# Patient Record
Sex: MALE | Race: WHITE | NOT HISPANIC OR LATINO
[De-identification: names, ages, dates, MRNs, and addresses within clinical notes are randomized per-mention and may not be internally consistent; named-entity substitution may affect disease eponyms.]

---
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
## 2024-03-01 ENCOUNTER — NON-APPOINTMENT (OUTPATIENT)
Age: 24
End: 2024-03-01

## 2024-04-10 ENCOUNTER — APPOINTMENT (OUTPATIENT)
Dept: PEDIATRIC GASTROENTEROLOGY | Facility: CLINIC | Age: 24
End: 2024-04-10
Payer: MEDICAID

## 2024-04-10 VITALS
HEART RATE: 73 BPM | HEIGHT: 74.06 IN | BODY MASS INDEX: 29.54 KG/M2 | WEIGHT: 230.16 LBS | SYSTOLIC BLOOD PRESSURE: 125 MMHG | DIASTOLIC BLOOD PRESSURE: 80 MMHG

## 2024-04-10 DIAGNOSIS — K82.4 CHOLESTEROLOSIS OF GALLBLADDER: ICD-10-CM

## 2024-04-10 DIAGNOSIS — K76.0 FATTY (CHANGE OF) LIVER, NOT ELSEWHERE CLASSIFIED: ICD-10-CM

## 2024-04-10 PROCEDURE — 99214 OFFICE O/P EST MOD 30 MIN: CPT

## 2024-04-10 NOTE — REVIEW OF SYSTEMS
[Bleeding] : bleeding [Negative] : Skin [Immunizations are up to date] : Immunizations are up to date [de-identified] : vWF deficiency (?)

## 2024-04-10 NOTE — HISTORY OF PRESENT ILLNESS
[de-identified] : March 2024 abd US showed multiple gb polyps, and fatty liver March 2024 ALT 98  lost mother and grandfather in the last year  Corey has gained weight since last visit  has not been more consistent with diet and exercise no abdominal pain, no fevers  has daily BM, regular, no blood  last Fibroscan 7/2021 shows that his liver condition was stable without significant worsening steatosis or fibrosis. [de-identified] : 7/14/21 A fibroscan was done using the XL probe:  dB/m, TE 2.8 kPa\par  8/2020 fibroscan using the M probe:  dB/m (S3), TE 4.1 kPa (F0-F1) \par  7/23/19 in office fibroscan - normal stiffness 4.9 kPa, \par  2/2018 echogenic liver, normal stiffness. 5.4 kPa\par  12/5/13 RUQ abd US: hepatic steatosis

## 2024-04-10 NOTE — CONSULT LETTER
[Dear  ___] : Dear  [unfilled], [Courtesy Letter:] : I had the pleasure of seeing your patient, [unfilled], in my office today. [Please see my note below.] : Please see my note below. [Consult Closing:] : Thank you very much for allowing me to participate in the care of this patient.  If you have any questions, please do not hesitate to contact me. [Sincerely,] : Sincerely, [FreeTextEntry3] : Alverto Loomis MD MSc \par  Director, Pediatric Endoscopy\par  Pediatric Gastroenterology and Nutrition\par  [facility]\par  Bety Villafuerte [facility] \par  [facility] \par  Division of Pediatric Gastroenterology and Nutrition \par  [address] [place] [address], [facility] [address] \[place]  [place] [address] \par  [phone] \par

## 2024-04-10 NOTE — PHYSICAL EXAM
[Well Developed] : well developed [Well Nourished] : well nourished [NAD] : in no acute distress [Alert and Active] : alert and active [PERRL] : pupils were equal, round, reactive to light  [Moist & Pink Mucous Membranes] : moist and pink mucous membranes [CTAB] : lungs clear to auscultation bilaterally [Regular Rate and Rhythm] : regular rate and rhythm [Soft] : soft  [Normal Bowel Sounds] : normal bowel sounds [No HSM] : no hepatosplenomegaly appreciated [Normal Tone] : normal tone [Well-Perfused] : well-perfused [Interactive] : interactive [Appropriate Behavior] : appropriate behavior [icteric] : anicteric [Distended] : non distended [Tender] : non tender [Rash] : no rash [Jaundice] : no jaundice

## 2024-04-10 NOTE — ASSESSMENT
[FreeTextEntry1] : Corey is a delightful young man with a history of elevated liver enzymes, and a known history of non-alcoholic fatty liver disease (NAFLD). His liver enzymes were recently more abnormal. He recently was found to have gallbladder polyps on ultrasound, which need to be followed, and cholecystectomy may need to be considered. The following was recommended: - continue diet and physical activity - return to office in 3-4 months, will need rpt liver enzymes and ultrasound, and should have fibroscan

## 2024-07-09 ENCOUNTER — NON-APPOINTMENT (OUTPATIENT)
Age: 24
End: 2024-07-09

## 2024-08-07 ENCOUNTER — APPOINTMENT (OUTPATIENT)
Dept: PEDIATRIC GASTROENTEROLOGY | Facility: CLINIC | Age: 24
End: 2024-08-07

## 2024-08-07 PROCEDURE — 99214 OFFICE O/P EST MOD 30 MIN: CPT

## 2024-08-07 PROCEDURE — 91200 LIVER ELASTOGRAPHY: CPT

## 2024-08-07 NOTE — CONSULT LETTER
[Dear  ___] : Dear  [unfilled], [Courtesy Letter:] : I had the pleasure of seeing your patient, [unfilled], in my office today. [Please see my note below.] : Please see my note below. [Consult Closing:] : Thank you very much for allowing me to participate in the care of this patient.  If you have any questions, please do not hesitate to contact me. [Sincerely,] : Sincerely, [FreeTextEntry3] : Alverto Loomis MD MSc \par  Director, Pediatric Endoscopy\par  Pediatric Gastroenterology and Nutrition\par  [facility]\par  Bety Villafuerte [facility] \par  [facility] \par  Division of Pediatric Gastroenterology and Nutrition \par  [address] [place] [address], [facility] [address] \[place]  [place] [address] \par  [phone] \par

## 2024-08-07 NOTE — ASSESSMENT
[FreeTextEntry1] : Corey is a delightful young man with a history of elevated liver enzymes, and a known history of non-alcoholic fatty liver disease (NAFLD). His liver enzymes were recently more abnormal. He recently was found to have gallbladder polyps on ultrasound, which need to be followed, and cholecystectomy may need to be considered. Fibroscan today shows stable findings. The following was recommended: - continue diet and physical activity - return to office in 3-4 months, will need rpt liver enzymes and ultrasound

## 2024-08-07 NOTE — PHYSICAL EXAM
[Well Developed] : well developed [Well Nourished] : well nourished [NAD] : in no acute distress [Alert and Active] : alert and active [Moist & Pink Mucous Membranes] : moist and pink mucous membranes [Well-Perfused] : well-perfused [Interactive] : interactive [Appropriate Behavior] : appropriate behavior [icteric] : anicteric [Respiratory Distress] : no respiratory distress  [Distended] : non distended [Focal Deficits] : no focal deficits [Rash] : no rash [Jaundice] : no jaundice [FreeTextEntry5] : no cyanosis

## 2024-08-07 NOTE — HISTORY OF PRESENT ILLNESS
[de-identified] : March 2024 abd US showed multiple gb polyps, and fatty liver March 2024 ALT 98 Corey has gained weight since last visit  has not been more consistent with diet and exercise no abdominal pain, no fevers  has daily BM, regular, no blood  last Fibroscan 7/2021 shows that his liver condition was stable without significant worsening steatosis or fibrosis. mother and grandfather passed in 2023 [de-identified] : 8/7/24 A Fibroscan was done using the XL probe.   dB/m TE 5.6 kPa  7/14/21 A fibroscan was done using the XL probe:  dB/m, TE 2.8 kPa 8/2020 fibroscan using the M probe:  dB/m (S3), TE 4.1 kPa (F0-F1)  7/23/19 in office fibroscan - normal stiffness 4.9 kPa,  2/2018 echogenic liver, normal stiffness. 5.4 kPa 12/5/13 RUQ abd US: hepatic steatosis

## 2024-08-07 NOTE — REVIEW OF SYSTEMS
[Bleeding] : bleeding [Negative] : Skin [Immunizations are up to date] : Immunizations are up to date [de-identified] : vWF deficiency (?)

## 2024-12-11 ENCOUNTER — APPOINTMENT (OUTPATIENT)
Dept: PEDIATRIC GASTROENTEROLOGY | Facility: CLINIC | Age: 24
End: 2024-12-11
Payer: MEDICAID

## 2024-12-11 VITALS
DIASTOLIC BLOOD PRESSURE: 77 MMHG | HEIGHT: 73.7 IN | HEART RATE: 80 BPM | BODY MASS INDEX: 28.14 KG/M2 | SYSTOLIC BLOOD PRESSURE: 151 MMHG | WEIGHT: 216.93 LBS

## 2024-12-11 DIAGNOSIS — K76.0 FATTY (CHANGE OF) LIVER, NOT ELSEWHERE CLASSIFIED: ICD-10-CM

## 2024-12-11 DIAGNOSIS — K82.4 CHOLESTEROLOSIS OF GALLBLADDER: ICD-10-CM

## 2024-12-11 PROCEDURE — 99214 OFFICE O/P EST MOD 30 MIN: CPT

## 2024-12-13 LAB
ALBUMIN SERPL ELPH-MCNC: 4.6 G/DL
ALP BLD-CCNC: 101 U/L
ALT SERPL-CCNC: 20 U/L
AST SERPL-CCNC: 16 U/L
BILIRUB DIRECT SERPL-MCNC: 0.1 MG/DL
BILIRUB INDIRECT SERPL-MCNC: 0.4 MG/DL
BILIRUB SERPL-MCNC: 0.5 MG/DL
GGT SERPL-CCNC: 19 U/L
PROT SERPL-MCNC: 8.1 G/DL

## 2024-12-30 ENCOUNTER — NON-APPOINTMENT (OUTPATIENT)
Age: 24
End: 2024-12-30

## 2025-03-18 ENCOUNTER — APPOINTMENT (OUTPATIENT)
Dept: PEDIATRIC GASTROENTEROLOGY | Facility: CLINIC | Age: 25
End: 2025-03-18
Payer: MEDICAID

## 2025-03-18 VITALS
WEIGHT: 216.05 LBS | HEIGHT: 73.9 IN | DIASTOLIC BLOOD PRESSURE: 84 MMHG | BODY MASS INDEX: 27.73 KG/M2 | SYSTOLIC BLOOD PRESSURE: 137 MMHG | HEART RATE: 64 BPM

## 2025-03-18 DIAGNOSIS — K76.0 FATTY (CHANGE OF) LIVER, NOT ELSEWHERE CLASSIFIED: ICD-10-CM

## 2025-03-18 DIAGNOSIS — K82.4 CHOLESTEROLOSIS OF GALLBLADDER: ICD-10-CM

## 2025-03-18 PROCEDURE — 99213 OFFICE O/P EST LOW 20 MIN: CPT

## 2025-06-24 ENCOUNTER — APPOINTMENT (OUTPATIENT)
Dept: PEDIATRIC GASTROENTEROLOGY | Facility: CLINIC | Age: 25
End: 2025-06-24

## 2025-07-07 ENCOUNTER — OUTPATIENT (OUTPATIENT)
Dept: OUTPATIENT SERVICES | Facility: HOSPITAL | Age: 25
LOS: 1 days | End: 2025-07-07

## 2025-07-07 VITALS
HEART RATE: 75 BPM | HEIGHT: 74 IN | WEIGHT: 214.95 LBS | RESPIRATION RATE: 16 BRPM | OXYGEN SATURATION: 97 % | TEMPERATURE: 98 F | DIASTOLIC BLOOD PRESSURE: 82 MMHG | SYSTOLIC BLOOD PRESSURE: 122 MMHG

## 2025-07-07 DIAGNOSIS — K81.1 CHRONIC CHOLECYSTITIS: ICD-10-CM

## 2025-07-07 RX ORDER — SODIUM CHLORIDE 9 G/1000ML
1000 INJECTION, SOLUTION INTRAVENOUS
Refills: 0 | Status: DISCONTINUED | OUTPATIENT
Start: 2025-07-14 | End: 2025-07-28

## 2025-07-07 NOTE — H&P PST ADULT - HISTORY OF PRESENT ILLNESS
23 y/o with fatty liver disease presents to PST preop for laparoscopic cholecystectomy. As per grandmother, pt has yearly US due to his fatty liver disease. She states abdominal US in 2024 showed multiple gallbladder polyps,

## 2025-07-07 NOTE — H&P PST ADULT - NEGATIVE ENMT SYMPTOMS
no hearing difficulty/no ear pain/no tinnitus/no sinus symptoms/no nasal congestion/no nasal discharge/no nasal obstruction/no post-nasal discharge/no recurrent cold sores/no abnormal taste sensation/no gum bleeding/no dry mouth/no throat pain/no dysphagia

## 2025-07-07 NOTE — H&P PST ADULT - ASSESSMENT
25 y/o with fatty liver disease presents to PST preop for laparoscopic cholecystectomy. As per grandmother, pt has yearly US due to his fatty liver disease. She states abdominal US in 2024 showed multiple gallbladder polyps,

## 2025-07-07 NOTE — H&P PST ADULT - ENMT COMMENTS
hx of epistaxis in childhood with prior cauterizations x5. Subsequently dx with Mild type 1 von Willebrand disease - not followed by hematology. Currently has sporadic nose bleeds when nose is dry

## 2025-07-07 NOTE — H&P PST ADULT - NSICDXFAMILYHX_GEN_ALL_CORE_FT
FAMILY HISTORY:  Mother  Still living? Unknown  Family history of drug addiction, Age at diagnosis: Age Unknown    Grandparent  Still living? Unknown  Family history of mitral valve prolapse, Age at diagnosis: Age Unknown

## 2025-07-07 NOTE — H&P PST ADULT - PROBLEM SELECTOR PLAN 1
preop for laparoscopic cholecystectomy on 7/14/25  preop instructions given, pt and grandmother verbalized understanding  chlorhexidine wash provided

## 2025-07-11 NOTE — ASU PATIENT PROFILE, ADULT - FALL HARM RISK - UNIVERSAL INTERVENTIONS
Bed in lowest position, wheels locked, appropriate side rails in place/Call bell, personal items and telephone in reach/Instruct patient to call for assistance before getting out of bed or chair/Non-slip footwear when patient is out of bed/[place] to call system/Physically safe environment - no spills, clutter or unnecessary equipment/Purposeful Proactive Rounding/Room/bathroom lighting operational, light cord in reach

## 2025-07-14 ENCOUNTER — OUTPATIENT (OUTPATIENT)
Dept: INPATIENT UNIT | Facility: HOSPITAL | Age: 25
LOS: 1 days | End: 2025-07-14
Payer: COMMERCIAL

## 2025-07-14 ENCOUNTER — TRANSCRIPTION ENCOUNTER (OUTPATIENT)
Age: 25
End: 2025-07-14

## 2025-07-14 VITALS
OXYGEN SATURATION: 98 % | SYSTOLIC BLOOD PRESSURE: 133 MMHG | RESPIRATION RATE: 16 BRPM | HEART RATE: 83 BPM | DIASTOLIC BLOOD PRESSURE: 90 MMHG

## 2025-07-14 VITALS
HEIGHT: 74 IN | TEMPERATURE: 98 F | RESPIRATION RATE: 16 BRPM | HEART RATE: 68 BPM | OXYGEN SATURATION: 100 % | WEIGHT: 214.95 LBS | DIASTOLIC BLOOD PRESSURE: 91 MMHG | SYSTOLIC BLOOD PRESSURE: 134 MMHG

## 2025-07-14 DIAGNOSIS — K81.1 CHRONIC CHOLECYSTITIS: ICD-10-CM

## 2025-07-14 PROCEDURE — 88304 TISSUE EXAM BY PATHOLOGIST: CPT | Mod: 26

## 2025-07-14 DEVICE — CLIP APPLIER COVIDIEN ENDOCLIP II 10MM MED/LG: Type: IMPLANTABLE DEVICE | Status: FUNCTIONAL

## 2025-07-14 RX ORDER — SIMETHICONE 80 MG
160 TABLET,CHEWABLE ORAL ONCE
Refills: 0 | Status: DISCONTINUED | OUTPATIENT
Start: 2025-07-14 | End: 2025-07-28

## 2025-07-14 RX ORDER — ONDANSETRON HCL/PF 4 MG/2 ML
4 VIAL (ML) INJECTION ONCE
Refills: 0 | Status: COMPLETED | OUTPATIENT
Start: 2025-07-14 | End: 2025-07-14

## 2025-07-14 RX ORDER — OXYCODONE HYDROCHLORIDE 30 MG/1
1 TABLET ORAL
Qty: 12 | Refills: 0
Start: 2025-07-14

## 2025-07-14 RX ORDER — HYDROMORPHONE/SOD CHLOR,ISO/PF 2 MG/10 ML
0.5 SYRINGE (ML) INJECTION
Refills: 0 | Status: DISCONTINUED | OUTPATIENT
Start: 2025-07-14 | End: 2025-07-14

## 2025-07-14 RX ORDER — OXYCODONE HYDROCHLORIDE 30 MG/1
5 TABLET ORAL ONCE
Refills: 0 | Status: DISCONTINUED | OUTPATIENT
Start: 2025-07-14 | End: 2025-07-14

## 2025-07-14 RX ADMIN — Medication 4 MILLIGRAM(S): at 17:22

## 2025-07-14 RX ADMIN — Medication 0.5 MILLIGRAM(S): at 17:00

## 2025-07-14 RX ADMIN — SODIUM CHLORIDE 30 MILLILITER(S): 9 INJECTION, SOLUTION INTRAVENOUS at 12:25

## 2025-07-14 RX ADMIN — OXYCODONE HYDROCHLORIDE 5 MILLIGRAM(S): 30 TABLET ORAL at 17:05

## 2025-07-14 RX ADMIN — Medication 0.5 MILLIGRAM(S): at 16:50

## 2025-07-14 NOTE — ASU DISCHARGE PLAN (ADULT/PEDIATRIC) - FINANCIAL ASSISTANCE
[facility] provides services at a reduced cost to those who are determined to be eligible through [facility]’s financial assistance program. Information regarding [facility]’s financial assistance program can be found by going to [email or web address] or by calling [phone].

## 2025-07-14 NOTE — ASU DISCHARGE PLAN (ADULT/PEDIATRIC) - ASU DC SPECIAL INSTRUCTIONSFT
- Can take shower tomorrow, steris will fall off   - Pain control Tylenol/Motrin, Oxy for breakthrough pain   - f/u with Dr. Yousif in office in 10 days   - No heavy lifting (>20lbs) for 6-8 wks

## 2025-07-14 NOTE — BRIEF OPERATIVE NOTE - OPERATION/FINDINGS
supraumbilical bijal entry, critical view of safety obtained, cystic duct & artery clipped and cut, gallbladder dissected off liver bed, gallbladder retrieved, hemostasis achieved

## 2025-07-18 LAB — SURGICAL PATHOLOGY STUDY: SIGNIFICANT CHANGE UP

## 2025-09-02 ENCOUNTER — APPOINTMENT (OUTPATIENT)
Dept: PEDIATRIC GASTROENTEROLOGY | Facility: CLINIC | Age: 25
End: 2025-09-02
Payer: COMMERCIAL

## 2025-09-02 VITALS
SYSTOLIC BLOOD PRESSURE: 138 MMHG | HEIGHT: 73.9 IN | BODY MASS INDEX: 28.3 KG/M2 | WEIGHT: 220.55 LBS | HEART RATE: 60 BPM | DIASTOLIC BLOOD PRESSURE: 89 MMHG

## 2025-09-02 DIAGNOSIS — E66.3 OVERWEIGHT: ICD-10-CM

## 2025-09-02 DIAGNOSIS — K82.4 CHOLESTEROLOSIS OF GALLBLADDER: ICD-10-CM

## 2025-09-02 DIAGNOSIS — K76.0 FATTY (CHANGE OF) LIVER, NOT ELSEWHERE CLASSIFIED: ICD-10-CM

## 2025-09-02 LAB
ALBUMIN SERPL ELPH-MCNC: 4.8 G/DL
ALP BLD-CCNC: 97 U/L
ALT SERPL-CCNC: 54 U/L
AST SERPL-CCNC: 34 U/L
BILIRUB DIRECT SERPL-MCNC: 0.12 MG/DL
BILIRUB INDIRECT SERPL-MCNC: 0.3 MG/DL
BILIRUB SERPL-MCNC: 0.4 MG/DL
GGT SERPL-CCNC: 19 U/L
PROT SERPL-MCNC: 8.1 G/DL

## 2025-09-02 PROCEDURE — 99214 OFFICE O/P EST MOD 30 MIN: CPT

## 2025-09-02 PROCEDURE — G2211 COMPLEX E/M VISIT ADD ON: CPT | Mod: NC

## (undated) DEVICE — BAG SPECIMEN RETRIEVAL ENDOBAG 3X6"

## (undated) DEVICE — TROCAR COVIDIEN VERSAPORT BLADELESS OPTICAL 5MM STANDARD

## (undated) DEVICE — TUBING INSUFFLATION LAP FILTER 10FT

## (undated) DEVICE — SUT VICRYL 0 27" UR-6

## (undated) DEVICE — TROCAR COVIDIEN VERSAONE BLUNT TIP HASSAN 12MM

## (undated) DEVICE — BASIN SET SINGLE

## (undated) DEVICE — TIP METZENBAUM SCISSOR MONOPOLAR ENDOCUT (ORANGE)

## (undated) DEVICE — DRAPE WARMING SOLUTION 44 X 44"

## (undated) DEVICE — DRAPE SURGICAL #1010

## (undated) DEVICE — DRAPE TOWEL BLUE 17" X 24"

## (undated) DEVICE — TUBING OLYMPUS INSUFFLATION

## (undated) DEVICE — SYR LUER LOK 20CC

## (undated) DEVICE — TUBING STRYKEFLOW II SUCTION / IRRIGATOR

## (undated) DEVICE — POSITIONER STRAP ARMBOARD VELCRO TS-30

## (undated) DEVICE — DRAPE COVER SNAP 36X30"

## (undated) DEVICE — DRSG STERISTRIPS 0.5 X 4"

## (undated) DEVICE — ELCTR GROUNDING PAD ADULT COVIDIEN

## (undated) DEVICE — GLV 7.5 PROTEXIS (CREAM) MICRO

## (undated) DEVICE — WARMING BLANKET UPPER ADULT

## (undated) DEVICE — TUBING HYDRO-SURG PLUS IRRIGATOR W SMOKEVAC & PROBE

## (undated) DEVICE — VENODYNE/SCD SLEEVE CALF MEDIUM

## (undated) DEVICE — DISSECTOR ENDOSCOPIC KITTNER SINGLE TIP

## (undated) DEVICE — SUT MONOCRYL 4-0 27" PS-2 UNDYED

## (undated) DEVICE — TROCAR COVIDIEN VERSAONE BLADED FIXATION 11MM STANDARD

## (undated) DEVICE — SOL IRR POUR H2O 500ML

## (undated) DEVICE — ELCTR BOVIE PENCIL SMOKE EVACUATION

## (undated) DEVICE — DRSG BENZOIN 0.6CC

## (undated) DEVICE — CATH ANGIO 14G X 3.25"

## (undated) DEVICE — DRAPE 3/4 SHEET 52X76"

## (undated) DEVICE — PACK GENERAL LAPAROSCOPY

## (undated) DEVICE — BLADE SURGICAL #15 CARBON

## (undated) DEVICE — SOL IRR BAG NS 0.9% 3000ML